# Patient Record
Sex: FEMALE | Race: WHITE | Employment: FULL TIME | URBAN - NONMETROPOLITAN AREA
[De-identification: names, ages, dates, MRNs, and addresses within clinical notes are randomized per-mention and may not be internally consistent; named-entity substitution may affect disease eponyms.]

---

## 2017-08-16 ENCOUNTER — HOSPITAL ENCOUNTER (OUTPATIENT)
Dept: NURSING | Age: 42
Discharge: HOME | End: 2017-08-16
Payer: COMMERCIAL

## 2017-08-16 VITALS
SYSTOLIC BLOOD PRESSURE: 109 MMHG | RESPIRATION RATE: 18 BRPM | DIASTOLIC BLOOD PRESSURE: 62 MMHG | TEMPERATURE: 97.2 F | HEART RATE: 68 BPM

## 2017-08-16 LAB
ANION GAP SERPL CALCULATED.3IONS-SCNC: 14 MEQ/L (ref 8–16)
BILIRUBIN URINE: NEGATIVE
BLOOD, URINE: NEGATIVE
BUN BLDV-MCNC: 8 MG/DL (ref 7–22)
CALCIUM SERPL-MCNC: 8.3 MG/DL (ref 8.5–10.5)
CHARACTER, URINE: CLEAR
CHLORIDE BLD-SCNC: 101 MEQ/L (ref 98–111)
CO2: 22 MEQ/L (ref 23–33)
COLOR: YELLOW
CREAT SERPL-MCNC: 0.7 MG/DL (ref 0.4–1.2)
GFR SERPL CREATININE-BSD FRML MDRD: > 90 ML/MIN/1.73M2
GLUCOSE BLD-MCNC: 130 MG/DL (ref 70–108)
GLUCOSE URINE: NEGATIVE MG/DL
KETONES, URINE: NEGATIVE
LEUKOCYTE ESTERASE, URINE: NEGATIVE
NITRITE, URINE: NEGATIVE
PH UA: 6.5
POTASSIUM SERPL-SCNC: 3.8 MEQ/L (ref 3.5–5.2)
PROTEIN UA: NEGATIVE
SODIUM BLD-SCNC: 137 MEQ/L (ref 135–145)
SPECIFIC GRAVITY, URINE: < 1.005 (ref 1–1.03)
UROBILINOGEN, URINE: 0.2 EU/DL

## 2017-08-16 PROCEDURE — G0463 HOSPITAL OUTPT CLINIC VISIT: HCPCS

## 2017-08-16 PROCEDURE — 81003 URINALYSIS AUTO W/O SCOPE: CPT

## 2017-08-16 PROCEDURE — 96360 HYDRATION IV INFUSION INIT: CPT

## 2017-08-16 PROCEDURE — 2580000003 HC RX 258: Performed by: FAMILY MEDICINE

## 2017-08-16 PROCEDURE — 96361 HYDRATE IV INFUSION ADD-ON: CPT

## 2017-08-16 PROCEDURE — 36415 COLL VENOUS BLD VENIPUNCTURE: CPT

## 2017-08-16 PROCEDURE — 80048 BASIC METABOLIC PNL TOTAL CA: CPT

## 2017-08-16 RX ORDER — SODIUM CHLORIDE 9 MG/ML
INJECTION, SOLUTION INTRAVENOUS CONTINUOUS
Status: DISCONTINUED | OUTPATIENT
Start: 2017-08-16 | End: 2017-08-17 | Stop reason: HOSPADM

## 2017-08-16 RX ADMIN — SODIUM CHLORIDE: 9 INJECTION, SOLUTION INTRAVENOUS at 12:57

## 2017-08-16 ASSESSMENT — PAIN DESCRIPTION - DESCRIPTORS: DESCRIPTORS: CONSTANT;SHARP;CRAMPING

## 2017-08-16 ASSESSMENT — PAIN - FUNCTIONAL ASSESSMENT: PAIN_FUNCTIONAL_ASSESSMENT: 0-10

## 2017-08-16 NOTE — IP AVS SNAPSHOT
After Visit Summary  (Discharge Instructions)    Medication List for Home    Based on the information you provided to us as well as any changes during this visit, the following is your updated medication list.  Compare this with your prescription bottles at home. If you have any questions or concerns, contact your primary care physician's office. Daily Medication List (This medication list can be shared with any healthcare provider who is helping you manage your medications)      ASK your doctor about these medications if you have questions        Last Dose    Next Dose Due AM NOON PM NIGHT    levocetirizine 5 MG tablet   Commonly known as:  XYZAL                                         levonorgestrel-ethinyl estradiol 0.15-30 MG-MCG per tablet   Commonly known as:  NORDETTE   Take by mouth daily                                         OMEPRAZOLE PO   Take by mouth daily                                         PATADAY 0.2 % Soln ophthalmic solution   Generic drug:  olopatadine                                         PROBIOTIC DAILY PO   Take by mouth daily                                                 Allergies as of 8/16/2017        Reactions    Amitiza [Lubiprostone] Other (See Comments)    Stomach cramps    Cefdinir Nausea Only    Flagyl [Metronidazole] Itching    Paxil [Paroxetine Hcl] Other (See Comments)    Prednisone Other (See Comments)    Headache    Augmentin [Amoxicillin-pot Clavulanate] Nausea And Vomiting    Codeine Nausea And Vomiting    Sulfa Antibiotics Nausea And Vomiting      Immunizations as of 8/16/2017     No immunizations on file. Last Vitals          Most Recent Value    Temp  97.2 °F (36.2 °C)    Pulse  92    Resp  20    BP  121/73         After Visit Summary    This summary was created for you. Thank you for entrusting your care to us.   The following information includes details about your hospital/visit stay along with steps you should take to help with your recovery once you leave the hospital.  In this packet, you will find information about the topics listed below:    · Instructions about your medications including a list of your home medications  · A summary of your hospital visit  · Follow-up appointments once you have left the hospital  · Your care plan at home      You may receive a survey regarding the care you received during your stay. Your input is valuable to us. We encourage you to complete and return your survey in the envelope provided. We hope you will choose us in the future for your healthcare needs. Patient Information     Patient Name CHATA Briseno 1975      Care Provided at:     Name Address Phone       [address] [phone]            Your Visit    Here you will find information about your visit, including the reason for your visit. Please take this sheet with you when you visit your doctor or other health care provider in the future. It will help determine the best possible medical care for you at that time. If you have any questions once you leave the hospital, please call the department phone number listed below. Why you were here     Your primary diagnosis was:  Not on File      Visit Information     Date & Time Department Dept. Phone    2017 Genie Curtis 281 [phone]       Follow-up Appointments    Below is a list of your follow-up and future appointments. This may not be a complete list as you may have made appointments directly with providers that we are not aware of or your providers may have made some for you. Please call your providers to confirm appointments. It is important to keep your appointments. Please bring your current insurance card, photo ID, co-pay, and all medication bottles to your appointment. If self-pay, payment is expected at the time of service. Future Appointments     11/27/2017 9:30 AM     Appointment with Veleta Dance, MD at [facility] ([phone])   Please arrive 15 minutes prior to appointment, bring photo ID and insurance card. [address] [place] [id number]         Preventive Care        Date Due    HIV screening is recommended for all people regardless of risk factors  aged 15-65 years at least once (lifetime) who have never been HIV tested. 1/10/1990    Tetanus Combination Vaccine (1 - Tdap) 1/10/1994    Pap Smear 1/10/1996    Diabetes Screening 1/10/2015    Yearly Flu Vaccine (1) 8/1/2017    Cholesterol Screening 6/10/2021                 Care Plan Once You Return Home    This section includes instructions you will need to follow once you leave the hospital.  Your care team will discuss these with you, so you and those caring for you know how to best care for your health needs at home. This section may also include educational information about certain health topics that may be of help to you. Discharge Instructions       ACTIVITY:  Continue usual care with your doctor. Call your doctor immediately if any severe problems or go to the nearest emergency room. I have been treated and hereby acknowledge receiving this instruction sheet. FOLLOW UP WITH DR. MERINO IF CONDITION WORSENS. Important information for a smoker       SMOKING: QUIT SMOKING. THIS IS THE MOST IMPORTANT ACTION YOU CAN TAKE TO IMPROVE YOUR CURRENT AND FUTURE HEALTH. Call the [address] [place] [address] at [facility] ([phone])    Smoking harms nonsmokers. When nonsmokers are around people who smoke, they absorb nicotine, carbon monoxide, and other ingredients of tobacco smoke.      DO NOT SMOKE AROUND CHILDREN     Children exposed to secondhand smoke are at an increased risk of:  Sudden Infant Death Syndrome (SIDS), acute respiratory infections, inflammation of the middle ear, and severe asthma. Over a longer time, it causes heart disease and lung cancer. There is no safe level of exposure to secondhand smoke. MyChart Signup     Our records indicate that you have an active MyChart account. You can view your After Visit Summary by going to [email or web address] and logging in with your [employer] username and password. If you don't have a [employer] username and password but a parent or guardian has access to your record, the parent or guardian should login with their own [employer] username and password and access your record to view the After Visit Summary. Additional Information  If you have questions, please contact the physician practice where you receive care. Remember, [employer] is NOT to be used for urgent needs. For medical emergencies, dial 911. For questions regarding your MyChart account call [phone]. If you have a clinical question, please call your doctor's office. View your information online  ? Review your current list of  medications, immunization, and allergies. ? Review your future test results online . ? Review your discharge instructions provided by your caregivers at discharge    Certain functionality such as prescription refills, scheduling appointments or sending messages to your provider are not activated if your provider does not use [employer] in his/her office    For questions regarding your MyChart account call [phone]. If you have a clinical question, please call your doctor's office. The information on all pages of the After Visit Summary has been reviewed with me, the patient and/or responsible adult, by my health care provider(s). I had the opportunity to ask questions regarding this information. I understand I should dispose of my armband safely at home to protect my health information.  A complete copy of the After Visit Summary has been given to me, the patient and/or responsible adult.            Patient Signature/Responsible Adult:____________________    Clinician Signature:_____________________    Date:_____________________    Time:_____________________

## 2017-08-16 NOTE — PROGRESS NOTES
12:44 PM PT ADMITTED TO [facility] FOR IV HYDRATION-STATES TO HX OF DIARRHEA-DIZZINESS SINCE Saturday  12:45 PM PATIENT RIGHTS AND RESPONSIBILITIES SHEET OFFERED TO PT TO READ.  1:00 PM LABS DRAWN WITH IV START  1:15 PM MIDSTREAM URINE OBTAINED AND SENT TO LAB  1:37 PM LABS FAXED TO OFFICE

## 2017-08-16 NOTE — PROGRESS NOTES
1420 Unable to reach Dr. Milka Lindsay office about lab work due to office being closed. 1509 Pt tolerating infusion well with no complaints. 1515 Pt discharged ambulatory with instructions with no complaints.     _m___ Safety:       (Environmental)   [place] [employer] to environment   Ensure ID band is correct and in place/ allergy band as needed   Assess for fall risk   Initiate fall precautions as applicable (fall band, side rails, etc.)   Call light within reach   Bed in low position/ wheels locked    __m__ Pain:        Assess pain level and characteristics   Administer analgesics as ordered   Assess effectiveness of pain management and report to MD as needed    __m__ Knowledge Deficit:   Assess baseline knowledge   Provide teaching at level of understanding   Provide teaching via preferred learning method   Evaluate teaching effectiveness    __m__ Hemodynamic/Respiratory Status:       (Pre and Post Procedure Monitoring)   Assess/Monitor vital signs and LOC   Assess Baseline SpO2 prior to any sedation   Obtain weight/height   Assess vital signs/ LOC until patient meets discharge criteria   Monitor procedure site and notify MD of any issues

## 2017-08-16 NOTE — IP AVS SNAPSHOT
Patient Information     Patient Name CHATA Deluca 1975         This is your updated medication list to keep with you all times      ASK your doctor about these medications     levocetirizine 5 MG tablet   Commonly known as:  XYZAL       levonorgestrel-ethinyl estradiol 0.15-30 MG-MCG per tablet   Commonly known as:  DANA       OMEPRAZOLE PO       PATADAY 0.2 % Soln ophthalmic solution   Generic drug:  olopatadine       PROBIOTIC DAILY PO

## 2017-11-27 ENCOUNTER — OFFICE VISIT (OUTPATIENT)
Dept: NEUROLOGY | Age: 42
End: 2017-11-27
Payer: COMMERCIAL

## 2017-11-27 VITALS
HEIGHT: 62 IN | WEIGHT: 156 LBS | SYSTOLIC BLOOD PRESSURE: 118 MMHG | BODY MASS INDEX: 28.71 KG/M2 | DIASTOLIC BLOOD PRESSURE: 72 MMHG | HEART RATE: 68 BPM

## 2017-11-27 DIAGNOSIS — R90.82 WHITE MATTER ABNORMALITY ON MRI OF BRAIN: ICD-10-CM

## 2017-11-27 DIAGNOSIS — G43.109 MIGRAINE WITH AURA AND WITHOUT STATUS MIGRAINOSUS, NOT INTRACTABLE: Primary | ICD-10-CM

## 2017-11-27 DIAGNOSIS — D32.9 MENINGIOMA (HCC): ICD-10-CM

## 2017-11-27 PROCEDURE — 99213 OFFICE O/P EST LOW 20 MIN: CPT | Performed by: PSYCHIATRY & NEUROLOGY

## 2017-11-27 PROCEDURE — G8419 CALC BMI OUT NRM PARAM NOF/U: HCPCS | Performed by: PSYCHIATRY & NEUROLOGY

## 2017-11-27 PROCEDURE — G8484 FLU IMMUNIZE NO ADMIN: HCPCS | Performed by: PSYCHIATRY & NEUROLOGY

## 2017-11-27 PROCEDURE — G8427 DOCREV CUR MEDS BY ELIG CLIN: HCPCS | Performed by: PSYCHIATRY & NEUROLOGY

## 2017-11-27 PROCEDURE — 1036F TOBACCO NON-USER: CPT | Performed by: PSYCHIATRY & NEUROLOGY

## 2017-11-27 NOTE — PROGRESS NOTES
NEUROLOGY OUT PATIENT FOLLOW UP NOTE:  11/27/201710:19 AM    Tadeo Colbert is here for follow up for headache, numbness. She denies headache or numbness. She is reporting occasional headaches with visual disturbance. She had abnormal MRI brain showing left parafalcing meningioma. she needs to undergo repeat MRI brain . She denies any numbness. She reports no vision problems. She denies any new symptoms. ROS:  Respiratory : no cough, no hemoptysis. Cardiac: no chest pain. No palpitations. Renal : no flank pain, no hematuria    Skin: no rash  Reviewed labs since last evaluation and discussed with patient. Allergies   Allergen Reactions    Amitiza [Lubiprostone] Other (See Comments)     Stomach cramps      Cefdinir Nausea Only    Flagyl [Metronidazole] Itching    Paxil [Paroxetine Hcl] Other (See Comments)    Prednisone Other (See Comments)     Headache    Augmentin [Amoxicillin-Pot Clavulanate] Nausea And Vomiting    Codeine Nausea And Vomiting    Sulfa Antibiotics Nausea And Vomiting       Current Outpatient Prescriptions:     levocetirizine (XYZAL) 5 MG tablet, , Disp: , Rfl:     levonorgestrel-ethinyl estradiol (NORDETTE) 0.15-30 MG-MCG per tablet, Take by mouth daily, Disp: , Rfl:     Probiotic Product (PROBIOTIC DAILY PO), Take by mouth daily, Disp: , Rfl:     OMEPRAZOLE PO, Take by mouth daily, Disp: , Rfl:     PATADAY 0.2 % SOLN ophthalmic solution, , Disp: , Rfl:       PE:   Vitals:    11/27/17 0926   BP: 118/72   Site: Left Arm   Position: Sitting   Cuff Size: Medium Adult   Pulse: 68   Weight: 156 lb (70.8 kg)   Height: 5' 2\" (1.575 m)     General Appearance:  alert and cooperative  Skin:  Skin color, texture, turgor normal. No rashes or lesions. Gen: AO3, NAD, Language is Intact. Head: PERRL, EOMI, no icterus  Neck: There is no carotid bruits. The Neck is supple. Neuro: CN 2-12 grossly intact with no focal deficits.  Power 5/5 Throughout symmetric, Reflexes are intact and symmetric. Long tracts are intact. Cerebellar exam is Intact. Sensory exam is intact to light touch. Gait is intact. Musculoskeletal:  Has no hand arthritis, no limitation of ROM in any of the four extremities. Lower extremities no edema        DATA:  Results for orders placed or performed during the hospital encounter of 62/43/13   Basic Metabolic Panel   Result Value Ref Range    Sodium 137 135 - 145 meq/L    Potassium 3.8 3.5 - 5.2 meq/L    Chloride 101 98 - 111 meq/L    CO2 22 (L) 23 - 33 meq/L    Glucose 130 (H) 70 - 108 mg/dL    BUN 8 7 - 22 mg/dL    CREATININE 0.7 0.4 - 1.2 mg/dL    Calcium 8.3 (L) 8.5 - 10.5 mg/dL   Urine reflex to culture   Result Value Ref Range    Glucose, Ur NEGATIVE NEGATIVE mg/dl    Bilirubin Urine NEGATIVE NEGATIVE    Ketones, Urine NEGATIVE NEGATIVE    Specific Gravity, Urine < 1.005 1.002 - 1.03    Blood, Urine NEGATIVE NEGATIVE    pH, UA 6.5 5.0 - 9.0    Protein, UA NEGATIVE NEGATIVE    Urobilinogen, Urine 0.2 0.0 - 1.0 eu/dl    Nitrite, Urine NEGATIVE NEGATIVE    Leukocyte Esterase, Urine NEGATIVE NEGATIVE    Color, UA YELLOW STRAW-YELL    Character, Urine CLEAR CLEAR-SL C   Anion Gap   Result Value Ref Range    Anion Gap 14.0 8.0 - 16.0 meq/L   Glomerular Filtration Rate, Estimated   Result Value Ref Range    Est, Glom Filt Rate >90 ml/min/1.73m2      MRI cervical spine 6/2016;  I reviewed the MRI cervical spine and agree with interpretation. Impression   ESSENTIALLY NORMAL MRI EXAMINATION OF THE CERVICAL SPINAL CORD PARENCHYMA. NOTHING ON THIS EXAM TO INDICATE EVIDENCE OF DEMYELINATING DISEASE.       VERY MINIMAL DEGENERATIVE CHANGES OF THE CERVICAL SPINE WITHOUT SIGNIFICANT CANAL OR FORAMINAL STENOSIS RESULTING.       ENLARGED RIGHT LEVEL II LYMPH NODE, 11 MM SHORT AXIS, SUGGESTING POSSIBLE PATHOLOGICALLY ENLARGED LYMPH NODE.  CLINICAL CORRELATION IS NECESSARY.         Sedrate=20  Homocystine level =6    MRI brain:5/20/2016  I reviewed the MRI brain and agree with

## 2017-11-27 NOTE — PATIENT INSTRUCTIONS
1. Repeat MRI brain with and without contrast left meningioma. Please call us to schedule. 2. Follow up 12 months or sooner if needed. 3. Report any new events. 4. Call if any questions or concerns.

## 2017-12-04 ENCOUNTER — HOSPITAL ENCOUNTER (OUTPATIENT)
Dept: MRI IMAGING | Age: 42
Discharge: HOME | End: 2017-12-04
Payer: COMMERCIAL

## 2017-12-04 DIAGNOSIS — R90.82 WHITE MATTER ABNORMALITY ON MRI OF BRAIN: ICD-10-CM

## 2017-12-04 DIAGNOSIS — G43.109 MIGRAINE WITH AURA AND WITHOUT STATUS MIGRAINOSUS, NOT INTRACTABLE: ICD-10-CM

## 2017-12-04 PROCEDURE — 70553 MRI BRAIN STEM W/O & W/DYE: CPT

## 2017-12-04 PROCEDURE — 6360000004 HC RX CONTRAST MEDICATION: Performed by: PSYCHIATRY & NEUROLOGY

## 2017-12-04 PROCEDURE — A9579 GAD-BASE MR CONTRAST NOS,1ML: HCPCS | Performed by: PSYCHIATRY & NEUROLOGY

## 2017-12-04 RX ADMIN — GADOTERIDOL 15 ML: 279.3 INJECTION, SOLUTION INTRAVENOUS at 21:27

## 2017-12-05 ENCOUNTER — TELEPHONE (OUTPATIENT)
Dept: NEUROLOGY | Age: 42
End: 2017-12-05

## 2017-12-05 NOTE — TELEPHONE ENCOUNTER
----- Message from Garth Fisher MD sent at 12/5/2017 10:36 AM EST -----  Please let patient know MRI Brain is stable compared to prior study.   Garth Fisher MD 10:35 AM

## 2018-01-22 ENCOUNTER — TELEPHONE (OUTPATIENT)
Dept: NEUROLOGY | Age: 43
End: 2018-01-22

## 2018-08-18 ENCOUNTER — HOSPITAL ENCOUNTER (EMERGENCY)
Age: 43
Discharge: HOME | End: 2018-08-18
Payer: COMMERCIAL

## 2018-08-18 VITALS
SYSTOLIC BLOOD PRESSURE: 116 MMHG | WEIGHT: 134 LBS | BODY MASS INDEX: 25.3 KG/M2 | TEMPERATURE: 97.9 F | DIASTOLIC BLOOD PRESSURE: 72 MMHG | HEART RATE: 85 BPM | OXYGEN SATURATION: 98 % | HEIGHT: 61 IN | RESPIRATION RATE: 18 BRPM

## 2018-08-18 DIAGNOSIS — H61.23 BILATERAL IMPACTED CERUMEN: Primary | ICD-10-CM

## 2018-08-18 PROCEDURE — 69209 REMOVE IMPACTED EAR WAX UNI: CPT

## 2018-08-18 PROCEDURE — 99202 OFFICE O/P NEW SF 15 MIN: CPT | Performed by: NURSE PRACTITIONER

## 2018-08-18 PROCEDURE — 2709999900 HC NON-CHARGEABLE SUPPLY

## 2018-08-18 PROCEDURE — 99212 OFFICE O/P EST SF 10 MIN: CPT

## 2018-08-18 RX ORDER — MONTELUKAST SODIUM 10 MG/1
10 TABLET ORAL NIGHTLY
COMMUNITY

## 2018-08-18 ASSESSMENT — PAIN SCALES - GENERAL: PAINLEVEL_OUTOF10: 2

## 2018-08-18 ASSESSMENT — ENCOUNTER SYMPTOMS
SHORTNESS OF BREATH: 0
ABDOMINAL PAIN: 0
NAUSEA: 0
VOMITING: 0
DIARRHEA: 0
CHEST TIGHTNESS: 0

## 2018-08-18 ASSESSMENT — PAIN DESCRIPTION - ORIENTATION: ORIENTATION: LEFT

## 2018-08-18 ASSESSMENT — PAIN DESCRIPTION - DESCRIPTORS: DESCRIPTORS: ACHING;PRESSURE

## 2018-08-18 ASSESSMENT — PAIN DESCRIPTION - LOCATION: LOCATION: EAR

## 2018-08-18 NOTE — ED PROVIDER NOTES
evaluated. May use over-the-counter ear wax removal drops as directed. Physical assessment findings, diagnostic testing(s) if applicable, and vital signs reviewed with patient/patient representative. Questions answered. Medications as directed, including OTC medications for supportive care. Education provided on medications. Differential diagnosis(s) discussed with patient/patient representative. Home care/self care instructions reviewed with patient/patient representative. Patient is to follow-up with family care provider in 2-3 days if no improvement. Patient is to go to the emergency department if symptoms worsen. Patient/patient representative is aware of care plan, questions answered, verbalizes understanding and is in agreement. Teach back method used for patient/patient representative teaching(s) and printed instructions attached to after visit summary. PATIENT REFERRED TO:  Enrique House MD  [address] [place] [address]  [phone]    Schedule an appointment as soon as possible for a visit in 1 week  for further evalation, If symptoms worsen, GO DIRECTLY TO [address] [place] [address] Urgent Care  [address] [facility]  [phone]    As needed, If symptoms worsen, GO DIRECTLY TO THE EMERGENCY DEPARTMENT    DISCHARGE MEDICATIONS:  New Prescriptions    CARBAMIDE PEROXIDE (AURO EARDROPS) 6.5 % OTIC SOLUTION    Place 5 drops into both ears 2 times daily As needed for future impactions.      Current Discharge Medication List          Vane Paz, [address], APRN - CNP  08/18/18 0916

## 2019-01-28 ENCOUNTER — OFFICE VISIT (OUTPATIENT)
Dept: NEUROLOGY | Age: 44
End: 2019-01-28
Payer: COMMERCIAL

## 2019-01-28 VITALS
DIASTOLIC BLOOD PRESSURE: 62 MMHG | HEART RATE: 76 BPM | HEIGHT: 61 IN | SYSTOLIC BLOOD PRESSURE: 108 MMHG | WEIGHT: 143.6 LBS | BODY MASS INDEX: 27.11 KG/M2

## 2019-01-28 DIAGNOSIS — G43.109 MIGRAINE WITH AURA AND WITHOUT STATUS MIGRAINOSUS, NOT INTRACTABLE: Primary | ICD-10-CM

## 2019-01-28 DIAGNOSIS — D32.9 MENINGIOMA (HCC): ICD-10-CM

## 2019-01-28 PROCEDURE — 1036F TOBACCO NON-USER: CPT | Performed by: PSYCHIATRY & NEUROLOGY

## 2019-01-28 PROCEDURE — G8484 FLU IMMUNIZE NO ADMIN: HCPCS | Performed by: PSYCHIATRY & NEUROLOGY

## 2019-01-28 PROCEDURE — G8427 DOCREV CUR MEDS BY ELIG CLIN: HCPCS | Performed by: PSYCHIATRY & NEUROLOGY

## 2019-01-28 PROCEDURE — G8419 CALC BMI OUT NRM PARAM NOF/U: HCPCS | Performed by: PSYCHIATRY & NEUROLOGY

## 2019-01-28 PROCEDURE — 99213 OFFICE O/P EST LOW 20 MIN: CPT | Performed by: PSYCHIATRY & NEUROLOGY

## 2019-01-28 RX ORDER — CETIRIZINE HYDROCHLORIDE 10 MG/1
10 TABLET ORAL DAILY
COMMUNITY

## 2019-03-07 ENCOUNTER — TELEPHONE (OUTPATIENT)
Dept: NEUROLOGY | Age: 44
End: 2019-03-07

## 2019-08-13 ENCOUNTER — HOSPITAL ENCOUNTER (OUTPATIENT)
Dept: NURSING | Age: 44
End: 2019-08-13
Payer: COMMERCIAL

## 2019-08-13 DIAGNOSIS — E86.0 DEHYDRATION: ICD-10-CM

## 2019-08-13 RX ORDER — 0.9 % SODIUM CHLORIDE 0.9 %
500 INTRAVENOUS SOLUTION INTRAVENOUS ONCE
Status: CANCELLED | OUTPATIENT
Start: 2019-08-13

## 2019-09-12 PROBLEM — E86.0 DEHYDRATION: Status: RESOLVED | Noted: 2019-08-13 | Resolved: 2019-09-12

## 2020-02-03 ENCOUNTER — OFFICE VISIT (OUTPATIENT)
Dept: NEUROLOGY | Age: 45
End: 2020-02-03
Payer: COMMERCIAL

## 2020-02-03 VITALS
HEART RATE: 80 BPM | SYSTOLIC BLOOD PRESSURE: 112 MMHG | WEIGHT: 142 LBS | DIASTOLIC BLOOD PRESSURE: 68 MMHG | BODY MASS INDEX: 26.13 KG/M2 | HEIGHT: 62 IN

## 2020-02-03 PROCEDURE — G8484 FLU IMMUNIZE NO ADMIN: HCPCS | Performed by: PSYCHIATRY & NEUROLOGY

## 2020-02-03 PROCEDURE — G8427 DOCREV CUR MEDS BY ELIG CLIN: HCPCS | Performed by: PSYCHIATRY & NEUROLOGY

## 2020-02-03 PROCEDURE — G8419 CALC BMI OUT NRM PARAM NOF/U: HCPCS | Performed by: PSYCHIATRY & NEUROLOGY

## 2020-02-03 PROCEDURE — 1036F TOBACCO NON-USER: CPT | Performed by: PSYCHIATRY & NEUROLOGY

## 2020-02-03 PROCEDURE — 99213 OFFICE O/P EST LOW 20 MIN: CPT | Performed by: PSYCHIATRY & NEUROLOGY

## 2020-02-03 RX ORDER — M-VIT,TX,IRON,MINS/CALC/FOLIC 27MG-0.4MG
1 TABLET ORAL DAILY
COMMUNITY

## 2020-02-03 RX ORDER — ACETAMINOPHEN 325 MG/1
650 TABLET ORAL EVERY 6 HOURS PRN
COMMUNITY

## 2020-02-03 NOTE — PATIENT INSTRUCTIONS
1. Repeat MRI brain with and without contrast left parafalcine meningioma when able. Please call us to schedule. 2. Follow up 12 months or sooner if needed. 3. Report any new events. 4. Call if any questions or concerns.

## 2021-03-12 ENCOUNTER — VIRTUAL VISIT (OUTPATIENT)
Dept: NEUROLOGY | Age: 46
End: 2021-03-12
Payer: COMMERCIAL

## 2021-03-12 DIAGNOSIS — G43.109 MIGRAINE WITH AURA AND WITHOUT STATUS MIGRAINOSUS, NOT INTRACTABLE: Primary | ICD-10-CM

## 2021-03-12 PROCEDURE — 99213 OFFICE O/P EST LOW 20 MIN: CPT | Performed by: NURSE PRACTITIONER

## 2021-03-12 NOTE — PROGRESS NOTES
3/12/2021    TELEHEALTH EVALUATION -- Audio/Visual (During ZOCTR-03 public health emergency)    HPI:    Denise Zamarripa (:  1975) has requested an audio/video evaluation for the following concern(s): headache. Her headaches are well controlled. She did not pursue MRI brain W/WO contrast to follow up on left parafalcine meningioma. She is concerned about getting the repeat MRI brain due to reaction to IV contrast. She reports she has an allergy to prednisone where she can develop severe headache. She is still exploring other facilities to see their options of contrast to pursue the MRI brain W/WO contrast. She is being evaluated via video link to discuss the plan of care going forward. Review of Systems   Eyes: Negative. Respiratory: Negative. Cardiovascular: Negative. Gastrointestinal: Negative. Prior to Visit Medications    Medication Sig Taking?  Authorizing Provider   Multiple Vitamins-Minerals (THERAPEUTIC MULTIVITAMIN-MINERALS) tablet Take 1 tablet by mouth daily  Historical Provider, MD   acetaminophen (TYLENOL) 325 MG tablet Take 650 mg by mouth every 6 hours as needed for Pain  Historical Provider, MD   cetirizine (ZYRTEC) 10 MG tablet Take 10 mg by mouth daily  Historical Provider, MD   montelukast (SINGULAIR) 10 MG tablet Take 10 mg by mouth nightly  Historical Provider, MD   levocetirizine (XYZAL) 5 MG tablet Take 5 mg by mouth nightly   Historical Provider, MD   levonorgestrel-ethinyl estradiol (NORDETTE) 0.15-30 MG-MCG per tablet Take by mouth daily  Historical Provider, MD       Social History     Tobacco Use    Smoking status: Former Smoker    Smokeless tobacco: Former User   Substance Use Topics    Alcohol use: No     Alcohol/week: 0.0 standard drinks    Drug use: No        Past Medical History:   Diagnosis Date    Chronic sinusitis    ,   Past Surgical History:   Procedure Laterality Date     SECTION      X2    LAPAROSCOPY     ,   Social History     Tobacco Use    Smoking status: Former Smoker    Smokeless tobacco: Former User   Substance Use Topics    Alcohol use: No     Alcohol/week: 0.0 standard drinks    Drug use: No   ,   Family History   Problem Relation Age of Onset    Diabetes Father     High Blood Pressure Father     Heart Disease Paternal Grandfather        PHYSICAL EXAMINATION:  [ INSTRUCTIONS:  \"[x]\" Indicates a positive item  \"[]\" Indicates a negative item  -- DELETE ALL ITEMS NOT EXAMINED]  Vital Signs: (As obtained by patient/caregiver or practitioner observation)    Blood pressure-  Heart rate-    Respiratory rate-    Temperature-  Pulse oximetry-     Constitutional: [x] Appears well-developed and well-nourished [x] No apparent distress      [] Abnormal-   Mental status  [x] Alert and awake  [x] Oriented to person/place/time [x]Able to follow commands      Eyes:  EOM    [x]  Normal  [] Abnormal-  Sclera  [x]  Normal  [] Abnormal -         Discharge [x]  None visible  [] Abnormal -    HENT:   [x] Normocephalic, atraumatic. [] Abnormal   [x] Mouth/Throat: Mucous membranes are moist.     External Ears [x] Normal  [] Abnormal-     Neck: [x] No visualized mass     Pulmonary/Chest: [x] Respiratory effort normal.  [x] No visualized signs of difficulty breathing or respiratory distress        [] Abnormal-      Musculoskeletal:   [] Normal gait with no signs of ataxia         [x] Normal range of motion of neck        [] Abnormal-       Neurological:        [x] No Facial Asymmetry (Cranial nerve 7 motor function) (limited exam to video visit)          [x] No gaze palsy        [] Abnormal-         Skin:        [x] No significant exanthematous lesions or discoloration noted on facial skin         [] Abnormal-            Psychiatric:       [x] Normal Affect [x] No Hallucinations        [] Abnormal-     Other pertinent observable physical exam findings-     ASSESSMENT/PLAN:  1.  Migraine with aura and without status migrainosus, not intractable    Her headaches are well controlled. Her exam is nonfocal. She did not pursue MRI brain W/WO contrast to follow up on left parafalcine meningioma. She is concerned about getting the repeat MRI brain due to reaction to IV contrast. She reports she has an allergy to prednisone where she can develop severe headache. She is still exploring other facilities to see their options of contrast to have the MRI. After detailed discussion with patient we agreed on the following plan. Plan:  1. Repeat MRI brain with and without contrast left parafalcine meningioma when able. Please call us to schedule. 2. Follow up 12 months or sooner if needed. 3. Report any new events. 4. Call if any questions or concerns. Junior Machado, was evaluated through a synchronous (real-time) audio-video encounter. The patient (or guardian if applicable) is aware that this is a billable service. Verbal consent to proceed has been obtained within the past 12 months. The visit was conducted pursuant to the emergency declaration under the [address] [place] [address] authority and the [email or web address] and [employer] General Act. Patient identification was verified, and a caregiver was present when appropriate. The patient was located in a state where the provider was credentialed to provide care. Total time spent on this encounter: 23 minutes    --GIA Hogan CNP on 3/12/2021 at 11:33 AM    An electronic signature was used to authenticate this note.

## 2021-03-15 ASSESSMENT — ENCOUNTER SYMPTOMS
GASTROINTESTINAL NEGATIVE: 1
RESPIRATORY NEGATIVE: 1
EYES NEGATIVE: 1

## 2021-08-14 ENCOUNTER — HOSPITAL ENCOUNTER (OUTPATIENT)
Age: 46
Discharge: HOME | End: 2021-08-14
Payer: COMMERCIAL

## 2021-08-14 LAB
ALBUMIN SERPL-MCNC: 4.5 G/DL (ref 3.5–5.1)
ALP BLD-CCNC: 92 U/L (ref 38–126)
ALT SERPL-CCNC: 15 U/L (ref 11–66)
ANION GAP SERPL CALCULATED.3IONS-SCNC: 11 MEQ/L (ref 8–16)
AST SERPL-CCNC: 16 U/L (ref 5–40)
BILIRUB SERPL-MCNC: 0.5 MG/DL (ref 0.3–1.2)
BILIRUBIN DIRECT: < 0.2 MG/DL (ref 0–0.3)
BUN BLDV-MCNC: 9 MG/DL (ref 7–22)
CALCIUM SERPL-MCNC: 9.5 MG/DL (ref 8.5–10.5)
CHLORIDE BLD-SCNC: 101 MEQ/L (ref 98–111)
CO2: 25 MEQ/L (ref 23–33)
CREAT SERPL-MCNC: 0.8 MG/DL (ref 0.4–1.2)
GFR SERPL CREATININE-BSD FRML MDRD: 77 ML/MIN/1.73M2
GLUCOSE BLD-MCNC: 88 MG/DL (ref 70–108)
POTASSIUM SERPL-SCNC: 3.9 MEQ/L (ref 3.5–5.2)
SODIUM BLD-SCNC: 137 MEQ/L (ref 135–145)
TOTAL PROTEIN: 7.5 G/DL (ref 6.1–8)

## 2021-08-14 PROCEDURE — 80053 COMPREHEN METABOLIC PANEL: CPT

## 2021-08-14 PROCEDURE — 36415 COLL VENOUS BLD VENIPUNCTURE: CPT

## 2021-08-14 PROCEDURE — 82248 BILIRUBIN DIRECT: CPT

## 2021-09-15 ENCOUNTER — OFFICE VISIT (OUTPATIENT)
Dept: UROLOGY | Age: 46
End: 2021-09-15
Payer: COMMERCIAL

## 2021-09-15 VITALS
DIASTOLIC BLOOD PRESSURE: 69 MMHG | HEIGHT: 61 IN | SYSTOLIC BLOOD PRESSURE: 113 MMHG | WEIGHT: 157 LBS | BODY MASS INDEX: 29.64 KG/M2 | HEART RATE: 78 BPM

## 2021-09-15 DIAGNOSIS — N28.89 RENAL MASS: Primary | ICD-10-CM

## 2021-09-15 DIAGNOSIS — R31.0 GROSS HEMATURIA: ICD-10-CM

## 2021-09-15 PROCEDURE — 99204 OFFICE O/P NEW MOD 45 MIN: CPT | Performed by: UROLOGY

## 2021-09-15 RX ORDER — OMEPRAZOLE 10 MG/1
10 CAPSULE, DELAYED RELEASE ORAL 2 TIMES DAILY
COMMUNITY

## 2021-09-15 NOTE — PROGRESS NOTES
Dr. Sujey Martinez MD  [address]   Urology Clinic  New Patient Visit      Patient:  Hector Arita  YOB: 1975  Date: 9/15/2021    HISTORY OF PRESENT ILLNESS:   The patient is a 55 y.o. female who presents today for evaluation of the following problems: possible right renal mass noted on renal US and also intermittent hematuria. No exposure risks. No family histor. Overall the problem(s) : show no change. Associated Symptoms: No dysuria, gross hematuria. Pain Severity: 0/10    Summary of old records:   None    Imaging/Labs reviewed during today's visit:  I have independently reviewed and verified the following films during today's visit. Renal US report reviewed from [facility]. Urinalysis today:  No results found for this visit on 09/15/21.     Last BUN and creatinine:  Lab Results   Component Value Date    BUN 9 2021     Lab Results   Component Value Date    CREATININE 0.8 2021       Imaging Reviewed during this Office Visit:   (results were independently reviewed by physician and radiology report verified)    PAST MEDICAL, FAMILY AND SOCIAL HISTORY:  Past Medical History:   Diagnosis Date    Chronic sinusitis      Past Surgical History:   Procedure Laterality Date     SECTION      X2    LAPAROSCOPY  2006     Family History   Problem Relation Age of Onset    Diabetes Father     High Blood Pressure Father     Heart Disease Paternal Grandfather      Outpatient Medications Marked as Taking for the 9/15/21 encounter (Office Visit) with Sujey Martinez MD   Medication Sig Dispense Refill    MESALAMINE PO Take by mouth 2 times daily      omeprazole (PRILOSEC) 10 MG delayed release capsule Take 10 mg by mouth 2 times daily Pt unsure of dose      Multiple Vitamins-Minerals (THERAPEUTIC MULTIVITAMIN-MINERALS) tablet Take 1 tablet by mouth daily      acetaminophen (TYLENOL) 325 MG tablet Take 650 mg by mouth every 6 hours as needed for Pain      cetirizine (ZYRTEC) 10 MG tablet Take 10 mg by mouth daily      montelukast (SINGULAIR) 10 MG tablet Take 10 mg by mouth nightly      levocetirizine (XYZAL) 5 MG tablet Take 5 mg by mouth nightly       levonorgestrel-ethinyl estradiol (NORDETTE) 0.15-30 MG-MCG per tablet Take by mouth daily         Amitiza [lubiprostone], Cefdinir, Flagyl [metronidazole], Gadolinium derivatives, Iv dye [iodides], Paxil [paroxetine hcl], Prednisone, Prohance [gadoteridol], Augmentin [amoxicillin-pot clavulanate], Codeine, and Sulfa antibiotics  Social History     Tobacco Use   Smoking Status Former Smoker   Smokeless Tobacco Former User       Social History     Substance and Sexual Activity   Alcohol Use No    Alcohol/week: 0.0 standard drinks       REVIEW OF SYSTEMS:  Constitutional: negative  Eyes: negative  Respiratory: negative  Cardiovascular: negative  Gastrointestinal: negative  Genitourinary: negative except for what is in HPI  Musculoskeletal: negative  Skin: negative   Neurological: negative  Hematological/Lymphatic: negative  Psychological: negative    Physical Exam:    This a 55 y.o. female      Vitals:    09/15/21 0856   BP: 113/69   Pulse: 78     Constitutional: Patient in no acute distress. Neuro: Alert and oriented to person, place and time. Psych: mood and affect normal  HEENT negative  Lungs: Respiratory effort is normal  Cardiovascular: Normal peripheral pulses  Abdomen: Soft, non-tender, non-distended with no CVA, flank pain or hepatosplenomegaly. No hernias. Kidneys normal.  Lymphatics: No palpable lymphadenopathy. Bladder non-tender and not distended. Assessment and Plan      1. Renal mass    2. Gross hematuria           Plan:      No follow-ups on file. Check CT urogram.  Will do cysto also and next visit.           Dr. Sage Whitfield MD

## 2021-10-02 ENCOUNTER — HOSPITAL ENCOUNTER (OUTPATIENT)
Dept: CT IMAGING | Age: 46
Discharge: HOME | End: 2021-10-02
Payer: COMMERCIAL

## 2021-10-02 DIAGNOSIS — N28.89 RENAL MASS: ICD-10-CM

## 2021-10-02 PROCEDURE — 74178 CT ABD&PLV WO CNTR FLWD CNTR: CPT

## 2021-10-02 PROCEDURE — 6360000004 HC RX CONTRAST MEDICATION: Performed by: UROLOGY

## 2021-10-02 RX ADMIN — IOPAMIDOL 100 ML: 755 INJECTION, SOLUTION INTRAVENOUS at 07:55

## 2021-11-03 ENCOUNTER — PROCEDURE VISIT (OUTPATIENT)
Dept: UROLOGY | Age: 46
End: 2021-11-03
Payer: COMMERCIAL

## 2021-11-03 VITALS
SYSTOLIC BLOOD PRESSURE: 125 MMHG | HEART RATE: 100 BPM | BODY MASS INDEX: 31.11 KG/M2 | DIASTOLIC BLOOD PRESSURE: 82 MMHG | WEIGHT: 164.8 LBS | HEIGHT: 61 IN

## 2021-11-03 DIAGNOSIS — N28.89 RENAL MASS: ICD-10-CM

## 2021-11-03 DIAGNOSIS — R31.0 GROSS HEMATURIA: Primary | ICD-10-CM

## 2021-11-03 LAB
BILIRUBIN URINE: NEGATIVE
BLOOD URINE, POC: NEGATIVE
CHARACTER, URINE: CLEAR
COLOR, URINE: YELLOW
GLUCOSE URINE: NEGATIVE MG/DL
KETONES, URINE: NEGATIVE
LEUKOCYTE CLUMPS, URINE: NEGATIVE
NITRITE, URINE: NEGATIVE
PH, URINE: 6 (ref 5–9)
PROTEIN, URINE: NEGATIVE MG/DL
SPECIFIC GRAVITY, URINE: 1.02 (ref 1–1.03)
UROBILINOGEN, URINE: 0.2 EU/DL (ref 0–1)

## 2021-11-03 PROCEDURE — 52000 CYSTOURETHROSCOPY: CPT | Performed by: UROLOGY

## 2021-11-03 PROCEDURE — 81003 URINALYSIS AUTO W/O SCOPE: CPT | Performed by: UROLOGY

## 2021-11-03 NOTE — PROGRESS NOTES
Dr. Jacob Holly MD  Urologic Surgery        [address] [place] [address]. [place]  11/03/21    Patient:  Zhane Lorenzo  MRN: [id number]  YOB: 1975    Surgeon: Dr. Jacob Holly MD  Assistant: None    Pre-op Diagnosis: hematu  Post-op Diagnosis: Same    Procedure:   Cystoscopy. Anesthesia: Local  Complications: None  OR Blood Loss: Minimal  Fluids: Cystalloids  Specimens: None    Indication:  72-year-old female with history of hematuria. CT scan, urogram, was completed and was unremarkable. Presents today for completion of hematuria work-up. Narrative of the Procedure:    After informed consent was obtained in the preoperative area, the patient was taken back to the operating room and remained on the hospital gurHerculaneum. The patient was prepped and draped in a sterile manner. A time out occurred in which two patient identifiers were used. The flexible scope was carefully placed into the bladder. Findings:  Urethra: normal  Bladder Neck: Normal.  Papillary lesions: None  Trabeculations: None  Cellules/Diverticula: None  Bladder Stones: None   Ureteral Orifices: normal position with clear reflux bilaterally    OVERALL IMPRESSION: Normal cystoscopy      Follow-Up: Normal cystoscopy normal CT urogram.  See back in 1 year with urinalysis.     Jacob Holly MD  Electronically signed on 11/3/2021 at 9:07 AM

## 2021-12-21 ENCOUNTER — TELEPHONE (OUTPATIENT)
Dept: NEUROLOGY | Age: 46
End: 2021-12-21

## 2021-12-21 NOTE — TELEPHONE ENCOUNTER
Patient sent [employer] message asking if she can have CT head instead of MRI brain. Patient stated she has allergy to the MRI contrast and not to the CT contrast. She stated she also has allergy to steroids and is not able to take the premedication methylprednisolone. Her reaction to steroids include severe headache amd facial numbness/tingling. Please advise. Thank you.

## 2021-12-21 NOTE — TELEPHONE ENCOUNTER
IN order to get the best pictures I would recommend MRI brain W/WO contrast. I looked in the system she has had MRIs with contrast in the past. Does she recall what she took when she had MRI brain W/WO contrast on 11/7/2016 and MRI cervical spine W/WO contrast on 6/22/2016?   Yanique Frye, CNP

## 2022-03-10 ENCOUNTER — TELEPHONE (OUTPATIENT)
Dept: NEUROLOGY | Age: 47
End: 2022-03-10

## 2022-03-10 NOTE — TELEPHONE ENCOUNTER
Patient notified and verbalized understanding. Informed patient provider offering referral to higher level of care. Patient stated she will look into options and call office back.

## 2022-03-10 NOTE — TELEPHONE ENCOUNTER
Patient called stating she is unable to have MRI due to allergy to Gadolinium having hives, eyes swelling, tongue felt swollen, and bad headache. She also stated she has an allergy to the Prednisone of face tingling, and severe headache. She is again asking if she can have CT in place of MRI. Informed patient CT will not give as good of a picture as MRI. Patient again asking for CT due to allergies mentioned above. Please advise. Thank you.

## 2023-06-23 ENCOUNTER — HOSPITAL ENCOUNTER (OUTPATIENT)
Dept: WOMENS IMAGING | Age: 48
Discharge: HOME | End: 2023-06-23
Payer: COMMERCIAL

## 2023-06-23 DIAGNOSIS — Z12.31 VISIT FOR SCREENING MAMMOGRAM: ICD-10-CM

## 2023-06-23 PROCEDURE — 77063 BREAST TOMOSYNTHESIS BI: CPT

## 2023-06-26 ENCOUNTER — HOSPITAL ENCOUNTER (OUTPATIENT)
Dept: WOMENS IMAGING | Age: 48
Discharge: HOME | End: 2023-06-26
Attending: RADIOLOGY

## 2023-06-26 DIAGNOSIS — Z00.6 ENCOUNTER FOR EXAMINATION FOR NORMAL COMPARISON OR CONTROL IN CLINICAL RESEARCH PROGRAM: ICD-10-CM

## 2023-08-24 ENCOUNTER — HOSPITAL ENCOUNTER (EMERGENCY)
Age: 48
Discharge: HOME | End: 2023-08-24
Payer: COMMERCIAL

## 2023-08-24 VITALS
DIASTOLIC BLOOD PRESSURE: 80 MMHG | BODY MASS INDEX: 30.21 KG/M2 | TEMPERATURE: 98.8 F | SYSTOLIC BLOOD PRESSURE: 121 MMHG | HEIGHT: 61 IN | WEIGHT: 160 LBS | RESPIRATION RATE: 18 BRPM | HEART RATE: 82 BPM | OXYGEN SATURATION: 97 %

## 2023-08-24 DIAGNOSIS — H92.02 OTALGIA OF LEFT EAR: ICD-10-CM

## 2023-08-24 DIAGNOSIS — J30.9 ALLERGIC RHINITIS, UNSPECIFIED SEASONALITY, UNSPECIFIED TRIGGER: Primary | ICD-10-CM

## 2023-08-24 PROCEDURE — 99213 OFFICE O/P EST LOW 20 MIN: CPT

## 2023-08-24 PROCEDURE — 99213 OFFICE O/P EST LOW 20 MIN: CPT | Performed by: EMERGENCY MEDICINE

## 2023-08-24 RX ORDER — AMOXICILLIN 875 MG/1
875 TABLET, COATED ORAL 2 TIMES DAILY
Qty: 14 TABLET | Refills: 0 | Status: SHIPPED | OUTPATIENT
Start: 2023-08-24 | End: 2023-08-31

## 2023-08-24 ASSESSMENT — ENCOUNTER SYMPTOMS
SINUS PRESSURE: 1
RHINORRHEA: 1
COUGH: 0
SORE THROAT: 0

## 2023-08-24 ASSESSMENT — PAIN DESCRIPTION - LOCATION: LOCATION: HEAD;EAR

## 2023-08-24 ASSESSMENT — PAIN DESCRIPTION - ORIENTATION: ORIENTATION: LEFT

## 2023-08-24 ASSESSMENT — PAIN - FUNCTIONAL ASSESSMENT: PAIN_FUNCTIONAL_ASSESSMENT: 0-10

## 2023-08-24 ASSESSMENT — PAIN DESCRIPTION - DESCRIPTORS: DESCRIPTORS: ACHING

## 2023-08-24 ASSESSMENT — PAIN SCALES - GENERAL: PAINLEVEL_OUTOF10: 3

## 2023-08-24 NOTE — DISCHARGE INSTRUCTIONS
Continue current medications    Drink plenty of fluids    Tylenol/ibuprofen as needed    Fill the prescription for amoxicillin if symptoms do not improve in 3 to 4 days, sooner if symptoms worsen

## 2023-08-24 NOTE — ED NOTES
Pt with complaints of left ear pain that started on Tuesday. Denies any drainage. States she has not tried to take anything for pain.      Sayra Pemberton, BANN  26/28/92 [id number]

## 2024-09-09 ENCOUNTER — HOSPITAL ENCOUNTER (EMERGENCY)
Age: 49
Discharge: HOME | End: 2024-09-09
Payer: COMMERCIAL

## 2024-09-09 VITALS
BODY MASS INDEX: 31.18 KG/M2 | TEMPERATURE: 98.2 F | SYSTOLIC BLOOD PRESSURE: 126 MMHG | HEART RATE: 98 BPM | RESPIRATION RATE: 18 BRPM | DIASTOLIC BLOOD PRESSURE: 88 MMHG | OXYGEN SATURATION: 97 % | WEIGHT: 165 LBS

## 2024-09-09 DIAGNOSIS — J01.00 ACUTE NON-RECURRENT MAXILLARY SINUSITIS: Primary | ICD-10-CM

## 2024-09-09 LAB — SARS-COV-2 RDRP RESP QL NAA+PROBE: NOT  DETECTED

## 2024-09-09 PROCEDURE — 87635 SARS-COV-2 COVID-19 AMP PRB: CPT

## 2024-09-09 PROCEDURE — 99213 OFFICE O/P EST LOW 20 MIN: CPT

## 2024-09-09 RX ORDER — ONDANSETRON 4 MG/1
4 TABLET, ORALLY DISINTEGRATING ORAL 3 TIMES DAILY PRN
Qty: 21 TABLET | Refills: 0 | Status: SHIPPED | OUTPATIENT
Start: 2024-09-09

## 2024-09-09 RX ORDER — AZITHROMYCIN 250 MG/1
TABLET, FILM COATED ORAL
Qty: 6 TABLET | Refills: 0 | Status: SHIPPED | OUTPATIENT
Start: 2024-09-09 | End: 2024-09-19

## 2024-09-09 ASSESSMENT — PAIN SCALES - GENERAL: PAINLEVEL_OUTOF10: 5

## 2024-09-09 ASSESSMENT — PAIN DESCRIPTION - DESCRIPTORS: DESCRIPTORS: PRESSURE

## 2024-09-09 ASSESSMENT — PAIN DESCRIPTION - LOCATION: LOCATION: FACE

## 2024-09-09 ASSESSMENT — PAIN DESCRIPTION - PAIN TYPE: TYPE: ACUTE PAIN

## 2024-09-09 ASSESSMENT — PAIN - FUNCTIONAL ASSESSMENT: PAIN_FUNCTIONAL_ASSESSMENT: 0-10

## 2024-09-10 ASSESSMENT — ENCOUNTER SYMPTOMS
SINUS PRESSURE: 1
ABDOMINAL PAIN: 0
SINUS PAIN: 1
WHEEZING: 0
COUGH: 0
SHORTNESS OF BREATH: 0

## 2025-04-08 ENCOUNTER — TRANSCRIBE ORDERS (OUTPATIENT)
Dept: ADMINISTRATIVE | Age: 50
End: 2025-04-08

## 2025-04-08 DIAGNOSIS — M21.41 FLAT FOOT, ACQUIRED, RIGHT: ICD-10-CM

## 2025-04-08 DIAGNOSIS — M21.42 FLAT FOOT, ACQUIRED, LEFT: ICD-10-CM

## 2025-04-08 DIAGNOSIS — M72.2 PLANTAR FASCIITIS, RIGHT: Primary | ICD-10-CM

## 2025-04-08 DIAGNOSIS — Q66.89: ICD-10-CM

## 2025-06-18 ENCOUNTER — LAB (OUTPATIENT)
Dept: LAB | Age: 50
End: 2025-06-18

## 2025-06-19 ENCOUNTER — TRANSCRIBE ORDERS (OUTPATIENT)
Dept: ADMINISTRATIVE | Age: 50
End: 2025-06-19

## 2025-06-19 DIAGNOSIS — Z12.31 ENCOUNTER FOR SCREENING MAMMOGRAM FOR MALIGNANT NEOPLASM OF BREAST: Primary | ICD-10-CM

## 2025-06-27 LAB — CYTOLOGY THIN PREP PAP: NORMAL
